# Patient Record
Sex: FEMALE | Race: OTHER | URBAN - METROPOLITAN AREA
[De-identification: names, ages, dates, MRNs, and addresses within clinical notes are randomized per-mention and may not be internally consistent; named-entity substitution may affect disease eponyms.]

---

## 2022-02-01 ENCOUNTER — NEW PATIENT (OUTPATIENT)
Dept: URBAN - METROPOLITAN AREA CLINIC 53 | Facility: CLINIC | Age: 57
End: 2022-02-01

## 2022-02-01 DIAGNOSIS — Z01.01: ICD-10-CM

## 2022-02-01 DIAGNOSIS — H52.4: ICD-10-CM

## 2022-02-01 PROCEDURE — 92004 COMPRE OPH EXAM NEW PT 1/>: CPT

## 2022-02-01 PROCEDURE — 92015 DETERMINE REFRACTIVE STATE: CPT

## 2022-02-01 ASSESSMENT — TONOMETRY
OS_IOP_MMHG: 17
OD_IOP_MMHG: 16

## 2022-02-01 ASSESSMENT — KERATOMETRY
OD_AXISANGLE_DEGREES: 165
OS_K2POWER_DIOPTERS: 44.75
OS_AXISANGLE2_DEGREES: 100
OD_K2POWER_DIOPTERS: 45.00
OS_AXISANGLE_DEGREES: 010
OS_K1POWER_DIOPTERS: 44.25
OD_K1POWER_DIOPTERS: 43.75
OD_AXISANGLE2_DEGREES: 75

## 2022-02-01 ASSESSMENT — VISUAL ACUITY
OS_CC: 20/20
OU_SC: J14
OD_SC: J16
OS_CC: J1+
OS_SC: J16
OU_SC: 20/20
OU_CC: 20/20
OS_SC: 20/20-2
OU_CC: J1+
OD_SC: 20/20
OD_CC: 20/20
OD_CC: J1+

## 2023-10-17 ENCOUNTER — APPOINTMENT (RX ONLY)
Dept: URBAN - METROPOLITAN AREA CLINIC 342 | Facility: CLINIC | Age: 58
Setting detail: DERMATOLOGY
End: 2023-10-17

## 2023-10-17 DIAGNOSIS — Z41.9 ENCOUNTER FOR PROCEDURE FOR PURPOSES OTHER THAN REMEDYING HEALTH STATE, UNSPECIFIED: ICD-10-CM

## 2023-10-17 PROCEDURE — ? PRODUCT LINE (REVISION)

## 2023-10-17 PROCEDURE — ? MEDICAL CONSULTATION HYDRAFACIAL

## 2023-10-17 PROCEDURE — ? COSMETIC CONSULTATION: IPL

## 2023-10-17 PROCEDURE — ? ADDITIONAL NOTES

## 2023-10-17 NOTE — PROCEDURE: ADDITIONAL NOTES
Detail Level: Zone
Additional Notes: Briefly discussed subnovii for under eye, no pricing was given at this time
Render Risk Assessment In Note?: yes

## 2023-10-17 NOTE — PROCEDURE: PRODUCT LINE (REVISION)
Product 20 Application Directions: Recommended daily use unless dryness occurs
Allow Plan To Count Towards E/M Coding: Yes
Product 3 Price (In Dollars - Numeric Only, No Special Characters Or $): 154.00
Product 32 Units: 0
Product 27 Price (In Dollars - Numeric Only, No Special Characters Or $): 0.00
Product 7 Price (In Dollars - Numeric Only, No Special Characters Or $): 38.00
Name Of Product 11: Revox Line Relaxer
Name Of Product 17: Papaya Cleanser
Product 1 Application Directions: Apply one pump to face 2x daily
Product 5 Application Directions: Apply a pea sized amount to face only at night.
Product 15 Price (In Dollars - Numeric Only, No Special Characters Or $): 79.00
Product 1 Units: 1
Name Of Product 13: Pumpkin Enzyme Mask
Product 9 Price (In Dollars - Numeric Only, No Special Characters Or $): 58.00
Name Of Product 19: Finishing Touch
Product 11 Price (In Dollars - Numeric Only, No Special Characters Or $): 180.00
Name Of Product 21: Intellishade Truphysical
Product 7 Application Directions: Apply to face after cleanser in the morning
Product 13 Application Directions: Using fingertips, apply generous amount hands, lightly scrub mask onto face avoiding eye area. Leave mask on 15-20 minutes. Remove with warm water on a clean wash cloth. Use once weekly.
Name Of Product 6: Hydrating Serum
Product 3 Application Directions: Apply one to one pump to face in the morning and evening. Mix with vitamin c
Product 19 Price (In Dollars - Numeric Only, No Special Characters Or $): 48.00
Product 15 Application Directions: Use twice daily
Name Of Product 2: Nectifirm Advanced
Product 13 Price (In Dollars - Numeric Only, No Special Characters Or $): 40.00
Name Of Product 23: C+ eye cream
Product 4 Price (In Dollars - Numeric Only, No Special Characters Or $): 114.00
Name Of Product 14: Brightening face cleanser
Product 9 Application Directions: Apply to backs of hands morning and night post 1 week vi peel
Product 21 Price (In Dollars - Numeric Only, No Special Characters Or $): 168.00
Name Of Product 4: DEJ eye cream
Product 23 Price (In Dollars - Numeric Only, No Special Characters Or $): 57.00
Name Of Product 8: Retinol 1.0%
Product 11 Application Directions: Apply 1-2 Pumps prior to applying moisturizer (AM/PM)\\nYou may mix this product with your dej face cream for one single application, or you can layer your products.
Name Of Product 16: Vitamin C 15%
Product 6 Price (In Dollars - Numeric Only, No Special Characters Or $): 100.00
Product 17 Application Directions: Wash face every other day with this cleanser and rotate with gentle cleanser
Product 19 Application Directions: Use once a week
Name Of Product 12: Youthful lip replenisher
Name Of Product 18: Gentle Cleansing Lotion
Product 10 Price (In Dollars - Numeric Only, No Special Characters Or $): 192.00
Product 4 Application Directions: Apply pea sized amount to both eyes morning and night.
Product 8 Price (In Dollars - Numeric Only, No Special Characters Or $): 120.00
Product 16 Price (In Dollars - Numeric Only, No Special Characters Or $): 133.00
Name Of Product 20: Soothing Facial Rinse
Product 12 Price (In Dollars - Numeric Only, No Special Characters Or $): 36.00
Detail Level: Zone
Product 2 Application Directions: Apply to neck, twice daily in upward motion.\\nApply a mineral sunscreen on top of the product when using in the AM\\nYou may also apply a pump of dej face cream to this product if more moisture is needed.
Name Of Product 22: Vitamin K
Product 6 Application Directions: Apply one pump twice daily before moisturizing
Name Of Product 1: Vitamin C+ Corrective Cream 30%
Product 8 Application Directions: Apply pea sized amount to face only in the evening, either in conjunction with the hydrating serum or separately. Begin using product once weekly for 2 weeks, then increase to twice weekly etc. Avoid eye area.
Name Of Product 5: DEJ Night Face Cream
Product 14 Application Directions: Use twice daily, morning and night
Product 1 Price (In Dollars - Numeric Only, No Special Characters Or $): 184.00
Product 10 Application Directions: Apply 1 pump morning and evening to whole face or 1/2 a pump to around the mouth.
Assigning Risk Information: Per AMA, level of risk is based upon consequences of the problem(s) addressed at the encounter when appropriately treated. Risk also includes medical decision making related to the need to initiate or forego further testing, treatment and/or hospitalization. Over the counter medication are assigned a risk level of low. Prescription medication management is assigned a risk level of moderate.
Name Of Product 9: Lumiquin hand cream
Product 12 Application Directions: Apply at least 2x daily
Product 22 Price (In Dollars - Numeric Only, No Special Characters Or $): 50.00
Risk Of Complication Category: No MDM
Name Of Product 3: DEJ Face Cream
Product 16 Application Directions: Apply one pump to palm of hand and apply to face every morning before moisturizer
Product 18 Application Directions: Utilize every other day
Name Of Product 15: Dej Face cream (travel size)

## 2023-10-18 ENCOUNTER — APPOINTMENT (RX ONLY)
Dept: URBAN - METROPOLITAN AREA CLINIC 342 | Facility: CLINIC | Age: 58
Setting detail: DERMATOLOGY
End: 2023-10-18

## 2023-10-18 DIAGNOSIS — Z41.9 ENCOUNTER FOR PROCEDURE FOR PURPOSES OTHER THAN REMEDYING HEALTH STATE, UNSPECIFIED: ICD-10-CM

## 2023-10-18 PROCEDURE — ? VENUS VERSA IPL

## 2023-10-18 PROCEDURE — ? ADDITIONAL NOTES

## 2023-10-18 ASSESSMENT — LOCATION ZONE DERM: LOCATION ZONE: FACE

## 2023-10-18 ASSESSMENT — LOCATION DETAILED DESCRIPTION DERM: LOCATION DETAILED: LEFT INFERIOR MEDIAL MALAR CHEEK

## 2023-10-18 ASSESSMENT — LOCATION SIMPLE DESCRIPTION DERM: LOCATION SIMPLE: LEFT CHEEK

## 2023-10-18 NOTE — PROCEDURE: ADDITIONAL NOTES
Detail Level: Zone
Additional Notes: Quoted subnovii under eyes $800
Render Risk Assessment In Note?: yes

## 2023-10-18 NOTE — PROCEDURE: VENUS VERSA IPL
External Cooling Fan Speed: 0
Treatment Number: 1
Post-Care Instructions: I reviewed with the patient in detail post-care instructions. Patient should stay away from the sun and wear sun protection until treated areas are fully healed. Pt. Advised to avoid glycolic and retinoids for at least 7 days.\\n\\nMoisturize morning and night and utilize c+ corrective cream\\nContinue using sunscreen daily
Consent: Written consent obtained, risks reviewed including but not limited to crusting, scabbing, blistering, scarring, darker or lighter pigmentary change, bruising, and/or incomplete response.
Pulse Duration (In Milliseconds): 15
Fluence Units: J/cm2
Price (Use Numbers Only, No Special Characters Or $): 400.00
Frequency: 1 Hz
Applicator: [facility]
Fluence: 16
Treated Area: medium area
Pulse Mode: single
Coolin
Detail Level: Zone

## 2023-11-15 ENCOUNTER — APPOINTMENT (RX ONLY)
Dept: URBAN - METROPOLITAN AREA CLINIC 342 | Facility: CLINIC | Age: 58
Setting detail: DERMATOLOGY
End: 2023-11-15

## 2023-11-15 DIAGNOSIS — Z41.9 ENCOUNTER FOR PROCEDURE FOR PURPOSES OTHER THAN REMEDYING HEALTH STATE, UNSPECIFIED: ICD-10-CM

## 2023-11-15 PROCEDURE — ? ADDITIONAL NOTES

## 2023-11-15 PROCEDURE — ? VENUS VERSA IPL

## 2023-11-15 ASSESSMENT — LOCATION ZONE DERM: LOCATION ZONE: FACE

## 2023-11-15 ASSESSMENT — LOCATION SIMPLE DESCRIPTION DERM: LOCATION SIMPLE: LEFT CHEEK

## 2023-11-15 ASSESSMENT — LOCATION DETAILED DESCRIPTION DERM: LOCATION DETAILED: LEFT INFERIOR MEDIAL MALAR CHEEK

## 2023-11-15 NOTE — PROCEDURE: VENUS VERSA IPL
External Cooling Fan Speed: 0
Treatment Number: 2
Post-Care Instructions: I reviewed with the patient in detail post-care instructions. Patient should stay away from the sun and wear sun protection until treated areas are fully healed. Pt. Advised to avoid glycolic and retinoids for at least 7 days.\\n\\nMoisturize morning and night and utilize c+ corrective cream\\nContinue using sunscreen daily
Consent: Written consent obtained, risks reviewed including but not limited to crusting, scabbing, blistering, scarring, darker or lighter pigmentary change, bruising, and/or incomplete response.
Pulse Duration (In Milliseconds): 15
Fluence Units: J/cm2
Price (Use Numbers Only, No Special Characters Or $): 400.00
Number Of Passes: 1
Frequency: 1 Hz
Applicator: [facility]
Fluence: 17
Treated Area: medium area
Pulse Mode: single
Coolin
Detail Level: Zone

## 2023-11-15 NOTE — PROCEDURE: ADDITIONAL NOTES
Detail Level: Zone
Additional Notes: Quoted subnovii under eyes $800\\nDiscussed microneedling in December, followed by hydrafacial 2 weeks post.\\nMicroneedling scheduled 12/11 $550 face and neck\\nHydrafacial scheduled 12/22 $299\\n\\nBriefly discussed revision skincare \\nDej face cream and hydrating serum
Render Risk Assessment In Note?: yes

## 2023-12-15 ENCOUNTER — APPOINTMENT (RX ONLY)
Dept: URBAN - METROPOLITAN AREA CLINIC 342 | Facility: CLINIC | Age: 58
Setting detail: DERMATOLOGY
End: 2023-12-15

## 2023-12-15 DIAGNOSIS — Z41.9 ENCOUNTER FOR PROCEDURE FOR PURPOSES OTHER THAN REMEDYING HEALTH STATE, UNSPECIFIED: ICD-10-CM

## 2023-12-15 PROCEDURE — ? MICRONEEDLING

## 2023-12-15 NOTE — PROCEDURE: MICRONEEDLING
Depth In Mm (Location #4): 0.1
Infusions (Optional): growth factor
Depth In Mm (Location #1): 0.5
Depth In Mm (Location #2): 1.5
Price (Use Numbers Only, No Special Characters Or $): 550.00
Length Of Topical Anesthesia Application (Optional): 20 minutes
Detail Level: Zone
Treatment Number (Optional): 1
Topical Anesthesia?: BLT cream (benzocaine 20%, lidocaine 10%, tetracaine 4%)
Consent: Written consent obtained, risks reviewed including but not limited to pain, scarring, infection and incomplete improvement.  Patient understands the procedure is cosmetic in nature and will require out of pocket payment.
Post-Care Instructions: After the procedure, take precautions agains sun exposure. Do not apply make-up for 3 days after the procedure. Avoid alcohol based toners for 10-14 days. After 7 days patient can return to their regular skin regimen.\\n\\n\\nFollow post care for one week, twice daily\\nWash face with foaming cleanser (one pump, wet hands, lather) remove with wet clean gauze or flat cotton rounds.\\nApply laser enzyme gel (nickel size amount for face)\\nUse avene water spray throughout day to avoid dryness and tightness
How Many Cc Of Bacteriostatic 0.9% Saline Were Added?: 0

## 2023-12-22 ENCOUNTER — APPOINTMENT (RX ONLY)
Dept: URBAN - METROPOLITAN AREA CLINIC 342 | Facility: CLINIC | Age: 58
Setting detail: DERMATOLOGY
End: 2023-12-22

## 2023-12-22 DIAGNOSIS — Z41.9 ENCOUNTER FOR PROCEDURE FOR PURPOSES OTHER THAN REMEDYING HEALTH STATE, UNSPECIFIED: ICD-10-CM

## 2023-12-22 PROCEDURE — ? HYDRAFACIAL

## 2023-12-22 PROCEDURE — ? PRODUCT LINE (REVISION)

## 2023-12-22 NOTE — PROCEDURE: PRODUCT LINE (REVISION)
Name Of Product 5: DEJ Night Face Cream
Product 39 Price (In Dollars - Numeric Only, No Special Characters Or $): 0.00
Product 12 Price (In Dollars - Numeric Only, No Special Characters Or $): 36.00
Product 14 Application Directions: Use twice daily, morning and night
Product 26 Units: 0
Name Of Product 22: Vitamin K
Name Of Product 8: Retinol 1.0%
Product 10 Units: 1
Send Charges To Patient Encounter: Yes
Product 19 Application Directions: Use once a week
Product 17 Price (In Dollars - Numeric Only, No Special Characters Or $): 38.00
Product 2 Application Directions: Apply to neck, twice daily in upward motion.\\nApply a mineral sunscreen on top of the product when using in the AM\\nYou may also apply a pump of dej face cream to this product if more moisture is needed.
Product 22 Price (In Dollars - Numeric Only, No Special Characters Or $): 50.00
Name Of Product 15: Dej Face cream (travel size)
Product 8 Price (In Dollars - Numeric Only, No Special Characters Or $): 135.00
Name Of Product 3: DEJ Face Cream
Product 12 Application Directions: Apply at least 2x daily
Name Of Product 20: Soothing Facial Rinse
Product 17 Application Directions: Wash face every other day with this cleanser and rotate with gentle cleanser
Product 15 Price (In Dollars - Numeric Only, No Special Characters Or $): 79.00
Product 8 Application Directions: Apply pea sized amount to face only in the evening, either in conjunction with the hydrating serum or separately. Begin using product 3x weekly for 2 weeks, then increase to twice weekly etc. Avoid eye area.
Product 3 Price (In Dollars - Numeric Only, No Special Characters Or $): 154.00
Name Of Product 13: Pumpkin Enzyme Mask
Product 5 Application Directions: Apply a pea sized amount to face only at night.
Product 20 Price (In Dollars - Numeric Only, No Special Characters Or $): 40.00
Name Of Product 1: Vitamin C+ Corrective Cream 30%
Product 10 Application Directions: Apply 1 pump morning and evening to whole face, neck and chest under moisturizer.
Name Of Product 18: Gentle Cleansing Lotion
Product 15 Application Directions: Use twice daily
Name Of Product 6: Hydrating Serum
Detail Level: Zone
Name Of Product 9: Lumiquin hand cream
Product 1 Price (In Dollars - Numeric Only, No Special Characters Or $): 184.00
Product 20 Application Directions: Recommended daily use unless dryness occurs
Product 3 Application Directions: Apply one to one pump to face in the morning and evening. Mix with vitamin c
Name Of Product 11: Revox Line Relaxer
Name Of Product 16: Vitamin C 15%
Product 6 Price (In Dollars - Numeric Only, No Special Characters Or $): 114.00
Name Of Product 23: C+ eye cream
Product 9 Price (In Dollars - Numeric Only, No Special Characters Or $): 58.00
Name Of Product 21: Intellishade Truphysical
Name Of Product 4: DEJ eye cream
Product 11 Price (In Dollars - Numeric Only, No Special Characters Or $): 180.00
Product 13 Application Directions: Using fingertips, apply generous amount hands, lightly scrub mask onto face avoiding eye area. Leave mask on 15-20 minutes. Remove with warm water on a clean wash cloth. Use once weekly.
Product 16 Price (In Dollars - Numeric Only, No Special Characters Or $): 133.00
Assigning Risk Information: Per AMA, level of risk is based upon consequences of the problem(s) addressed at the encounter when appropriately treated. Risk also includes medical decision making related to the need to initiate or forego further testing, treatment and/or hospitalization. Over the counter medication are assigned a risk level of low. Prescription medication management is assigned a risk level of moderate.
Product 23 Price (In Dollars - Numeric Only, No Special Characters Or $): 64.00
Product 18 Application Directions: Utilize every other day
Product 1 Application Directions: Apply one pump to face 2x daily
Product 9 Application Directions: Apply to backs of hands morning and night post 1 week vi peel
Name Of Product 14: Brightening face cleanser
Product 6 Application Directions: Apply one pump twice daily before moisturizing
Risk Of Complication Category: No MDM
Product 21 Price (In Dollars - Numeric Only, No Special Characters Or $): 168.00
Product 11 Application Directions: Apply 1-2 Pumps prior to applying moisturizer (AM/PM)\\nYou may mix this product with your dej face cream for one single application, or you can layer your products.
Name Of Product 19: Finishing Touch
Name Of Product 2: Nectifirm Advanced
Product 16 Application Directions: Apply one pump to palm of hand and apply to face every morning before moisturizer
Product 4 Application Directions: Apply pea sized amount to both eyes morning and night.
Product 7 Application Directions: Apply to face after cleanser in the morning
Product 10 Price (In Dollars - Numeric Only, No Special Characters Or $): 192.00
Name Of Product 12: Youthful lip replenisher
Product 19 Price (In Dollars - Numeric Only, No Special Characters Or $): 48.00
Product 2 Price (In Dollars - Numeric Only, No Special Characters Or $): 148.00
Name Of Product 17: Papaya Cleanser

## 2023-12-22 NOTE — PROCEDURE: HYDRAFACIAL
Solution Override
Treatment Number: 1
Number Of Passes Step 1: 0
Tip Override
Vacuum Pressure Low Setting (Will Not Render If Set To 0): 12
Indication: anti-aging
Solution Override: activ4 + betaHD
Solution: Activ-4
Comments: Utilized Murad Clarifying Booster
Solution Override: Antiox +
Solution: GlySal 7.5%
Tip: Hydropeel Tip, Teal
Procedure: Peel
Vacuum Pressure Low Setting (Will Not Render If Set To 0): 11
Solution: Antiox-6
Location: face
Vacuum Pressure Low Setting (Will Not Render If Set To 0): 16
Tip: Hydropeel Tip, Clear
Consent: Written consent obtained, risks reviewed including but not limited to crusting, scabbing, blistering, scarring, darker or lighter pigmentary change, bruising, and/or incomplete response.
Post-Care Instructions: I reviewed with the patient in detail post-care instructions. Patient should avoid direct sun exposure and wear sunscreen daily.
Price (Use Numbers Only, No Special Characters Or $): 299.00
Tip: Hydropeel Tip, Blue
Location Override: Face, neck and back

## 2024-02-05 ENCOUNTER — APPOINTMENT (RX ONLY)
Dept: URBAN - METROPOLITAN AREA CLINIC 342 | Facility: CLINIC | Age: 59
Setting detail: DERMATOLOGY
End: 2024-02-05

## 2024-02-05 DIAGNOSIS — Z41.9 ENCOUNTER FOR PROCEDURE FOR PURPOSES OTHER THAN REMEDYING HEALTH STATE, UNSPECIFIED: ICD-10-CM

## 2024-02-05 PROCEDURE — ? COSMETIC QUOTE

## 2024-02-05 PROCEDURE — ? HYDRAFACIAL

## 2024-02-05 NOTE — PROCEDURE: HYDRAFACIAL
Solution Override
Treatment Number: 1
Number Of Passes Step 1: 0
Tip Override
Vacuum Pressure Low Setting (Will Not Render If Set To 0): 12
Indication: anti-aging
Solution Override: activ4 + betaHD
Solution: Activ-4
Comments: Utilized Murad Clarifying Booster
Solution Override: Antiox +
Solution: GlySal 7.5%
Tip: Hydropeel Tip, Teal
Procedure: Peel
Vacuum Pressure Low Setting (Will Not Render If Set To 0): 11
Solution: Antiox-6
Location: face
Vacuum Pressure Low Setting (Will Not Render If Set To 0): 16
Tip: Hydropeel Tip, Clear
Consent: Written consent obtained, risks reviewed including but not limited to crusting, scabbing, blistering, scarring, darker or lighter pigmentary change, bruising, and/or incomplete response.
Post-Care Instructions: I reviewed with the patient in detail post-care instructions. Patient should avoid direct sun exposure and wear sunscreen daily.
Price (Use Numbers Only, No Special Characters Or $): 199.00
Tip: Hydropeel Tip, Blue
Location Override: Face, neck and back

## 2024-02-05 NOTE — PROCEDURE: COSMETIC QUOTE
Misc Procedure 3 Units: 0
Breast Procedure 8 Price/Unit (In Dollars- Use Only Numbers And Decimals): 0.00
Facility Fee Units (Optional): 1
Face Procedure 1: Subnovii (Perioral + Lowerface)
Laser 1 Price/Unit (In Dollars- Use Only Numbers And Decimals): 550
Include Sales Tax On Anesthesia Fees: No
Face Procedure 5 Price/Unit (In Dollars- Use Only Numbers And Decimals): 450
Detail Level: Zone
Face Procedure 9 Price/Unit (In Dollars- Use Only Numbers And Decimals): 299
Laser 2: IPL
Face Procedure 2 Price/Unit (In Dollars- Use Only Numbers And Decimals): 3000
Laser 2 Units: 3
Send Charges To Patient Encounter: Yes
Face Procedure 10 Gordon/Unit (In Dollars- Use Only Numbers And Decimals): 429
Face Procedure 3 Price/Unit (In Dollars- Use Only Numbers And Decimals): 400.00
Laser 2 Percentage Discount: 15
Face Procedure 4: Keravive Scalp Treatment
Face Procedure 8: Subnovii (SK removal)
Laser 1: Venus Viva Skin Resurfacing
Face Procedure 1 Price/Unit (In Dollars- Use Only Numbers And Decimals): 3000.00
Face Procedure 5: microneedling
Face Procedure 9: hydrafacial
Face Procedure 2: Subnovii (Neck)
Laser 2 Price/Unit (In Dollars- Use Only Numbers And Decimals): 400
Face Procedure 10: vi peel
Face Procedure 3: Subnovii under eye
Face Procedure 4 Price/Unit (In Dollars- Use Only Numbers And Decimals): 389
Face Procedure 8 Price/Unit (In Dollars- Use Only Numbers And Decimals): 150.00